# Patient Record
(demographics unavailable — no encounter records)

---

## 2024-10-22 NOTE — HISTORY OF PRESENT ILLNESS
[FreeTextEntry1] : LOTUS CORTES is a 72-year-old M, former smoker, w/ PMHx of HTN, HLD, DM, cardiac stent x 2 (on ASA & plavix), who was referred by Dr. Carlos Goldman for hemoptysis and right upper lobe mass. On 06/11/2024, he had navigational bronchoscopy and EBUS biopsy, which revealed adenocarcinoma of the RUL, negative LNs in level 7, 11R, 11L. On 07/19/2024, he underwent a right VATS, robotic assisted, right upper lobectomy, MLND. Surgical pathology revealed - pT3N0, Invasive papillary adenocarcinoma.   Patient was referred to oncologist Dr. Zachary Lau for further evaluation, he presents today for a post-op follow up with CXR.

## 2024-10-22 NOTE — CONSULT LETTER
[Dear  ___] : Dear  [unfilled], [Consult Letter:] : I had the pleasure of evaluating your patient, [unfilled]. [Please see my note below.] : Please see my note below. [Consult Closing:] : Thank you very much for allowing me to participate in the care of this patient.  If you have any questions, please do not hesitate to contact me. [Sincerely,] : Sincerely, [FreeTextEntry3] : Gaurav Rollins MD Professor, Cardiovascular & Thoracic Surgery [facility] [employer] Director of the [facility]  Director of Thoracic Surgery, [address] [place] [address] Phone: [phone] Fax: [phone]

## 2024-11-04 NOTE — ASSESSMENT
[FreeTextEntry1] : LOTUS CORTES is a 72-year-old M, former smoker, w/ PMHx of HTN, HLD, DM, cardiac stent x 2 (on ASA & plavix), who was referred by Dr. Carlos Goldman for hemoptysis and right upper lobe mass. On 06/11/2024, he had navigational bronchoscopy and EBUS biopsy, which revealed adenocarcinoma of the RUL, negative LNs in level 7, 11R, 11L. On 07/19/2024, he underwent a right VATS, robotic assisted, right upper lobectomy, MLND. Surgical pathology revealed - pT3N0, Invasive papillary adenocarcinoma, total tumor size 6.7cm. Spread Through Air Spaces: Present. Visceral Pleura Invasion: Not identified. Lymphovascular Invasion: Not identified. All margins negative. Patient was referred to oncologist Dr. Zachary Lau for further evaluation of stage IIB adenocarcinoma.   He presents today for a follow up visit to discuss his symptoms. Overall, he is recovering well, admits to productive cough with white mucus as well as SOB on exertion. Pian is well controlled, his incisions are healed. He had completed 3 cycles of Chemo on 08/28, 09/18, 10/09 but did not show up for last dose of treatment which is scheduled on 10/30 due to swelling of his feet and feeling of no energy. He will follow up with oncologist Dr. Lau. Zachary. We will plan for a CT scan of chest in 3 months for lung cancer surveillance. We will also give him some antitussive for better manager his symptoms. Patient agreed with the plan.   1. RX Antitussive  2. CT scan of chest no contrast in 3 months   Ashley NARVAEZ, RN, am scribing for and the presence of Dr. DAX MAYEN the following sections: history of present illness, past medical/family/surgical/family/social history, review of systems, vital signs, physical exam, and disposition.  DAX NARVAEZ, personally performed the services described in the documentation, reviewed the documentation recorded by the scribe in my presence and it accurately and completely records my words and actions.

## 2024-11-04 NOTE — HISTORY OF PRESENT ILLNESS
[FreeTextEntry1] : LOTUS CORTES is a 72-year-old M, former smoker, w/ PMHx of HTN, HLD, DM, cardiac stent x 2 (on ASA & plavix), who was referred by Dr. Carlos Goldman for hemoptysis and right upper lobe mass. On 06/11/2024, he had navigational bronchoscopy and EBUS biopsy, which revealed adenocarcinoma of the RUL, negative LNs in level 7, 11R, 11L. On 07/19/2024, he underwent a right VATS, robotic assisted, right upper lobectomy, MLND. Surgical pathology revealed - pT3N0, Invasive papillary adenocarcinoma, total tumor size 6.7cm. Spread Through Air Spaces: Present. Visceral Pleura Invasion: Not identified. Lymphovascular Invasion: Not identified. All margins negative.  Patient was referred to oncologist Dr. Zachary Lau for further evaluation of stage IIB adenocarcinoma, planning for 4 cycles of adjuvant carboplatin +paclitaxel, and addition of pembrolizumab maintenance after adjuvant chemotherapy for up to 18 cycles if tolerated. The patient presents today for a post-op follow up with CXR.

## 2024-11-04 NOTE — PHYSICAL EXAM
[] : no respiratory distress [Auscultation Breath Sounds / Voice Sounds] : lungs were clear to auscultation bilaterally [Heart Rate And Rhythm] : heart rate was normal and rhythm regular [Heart Sounds] : normal S1 and S2 [Heart Sounds Gallop] : no gallops [Murmurs] : no murmurs [Heart Sounds Pericardial Friction Rub] : no pericardial rub [Examination Of The Chest] : the chest was normal in appearance [Chest Visual Inspection Thoracic Asymmetry] : no chest asymmetry [Diminished Respiratory Excursion] : normal chest expansion [Abnormal Walk] : normal gait [Nail Clubbing] : no clubbing  or cyanosis of the fingernails [Musculoskeletal - Swelling] : no joint swelling seen [Motor Tone] : muscle strength and tone were normal [Oriented To Time, Place, And Person] : oriented to person, place, and time [Impaired Insight] : insight and judgment were intact [Affect] : the affect was normal

## 2024-11-04 NOTE — CONSULT LETTER
[FreeTextEntry3] : Gaurav Rollins MD Professor, Cardiovascular & Thoracic Surgery [facility] [employer] Director of the [facility]  Director of Thoracic Surgery, [address] [place] [address] Phone: [phone] Fax: [phone]

## 2024-11-04 NOTE — CONSULT LETTER
[FreeTextEntry3] : Gaurav Rollins MD Professor, Cardiovascular & Thoracic Surgery [facility] [employer] Director of the [facility]  Director of Thoracic Surgery, [address] [place] [address] Phone: [phone] Fax: [phone]

## 2025-07-02 NOTE — HISTORY OF PRESENT ILLNESS
[FreeTextEntry1] : LOTUS CORTES is a 73-year-old M, former smoker, w/ PMHx of HTN, HLD, DM, cardiac stent x 2 (on ASA & Plavix), who was referred by Dr. Carlos Goldman for hemoptysis and right upper lobe mass. On 06/11/2024, he had navigational bronchoscopy and EBUS biopsy, which revealed adenocarcinoma of the RUL, negative LNs in level 7, 11R, 11L. On 07/19/2024, he underwent a right VATS, robotic assisted, right upper lobectomy, MLND. Surgical pathology revealed - pT3N0, Invasive papillary adenocarcinoma, total tumor size 6.7cm. Spread Through Air Spaces: Present. Visceral Pleura Invasion: Not identified. Lymphovascular Invasion: Not identified. All margins negative.  Patient was referred to oncologist Dr. Zachary Lau for further evaluation of stage IIB adenocarcinoma, he completed chemotherapy in 2024 and still on immunotherapy (pembrolizumab)?? He presents today for a follow up visit to review the surveillance CT scan results, report is as follows:  CT chest on 06/29/2025:  -New right lower lobe subpleural 1.9 cm nodular opacity with associated reticular scarring and sub-centimeter ground-glass centrilobular nodules and left lower lobe patchy nodular opacity with surrounding ground glass attenuation. This may be infectious/inflammatory in etiology, however, short-term follow up in 6-8 weeks with low dose non-contrast CT is recommended.  -Mildly aneurysmal 4.2 cm ascending thoracic aorta.

## 2025-07-18 NOTE — CONSULT LETTER
[FreeTextEntry3] : Gaurav Rollins MD Professor, Cardiovascular & Thoracic Surgery [facility] [employer] Director of the [facility]  Director of Thoracic Surgery, [address] [place] [address] Phone: [phone] Fax: [phone]

## 2025-07-18 NOTE — ASSESSMENT
[FreeTextEntry1] : LOTUS CORTES is a 73-year-old M, former smoker, w/ PMHx of HTN, HLD, DM, cardiac stent x 2 (on ASA & Plavix), who was referred by Dr. Carlos Goldman for hemoptysis and right upper lobe mass. On 06/11/2024, he had navigational bronchoscopy and EBUS biopsy, which revealed adenocarcinoma of the RUL, negative LNs in level 7, 11R, 11L. On 07/19/2024, he underwent a right VATS, robotic assisted, right upper lobectomy, MLND. Surgical pathology revealed - pT3N0, Invasive papillary adenocarcinoma, total tumor size 6.7cm. Spread Through Air Spaces: Present. Visceral Pleura Invasion: Not identified. Lymphovascular Invasion: Not identified. All margins negative.  Patient was referred to oncologist Dr. Zachary Lau for further evaluation of stage IIB adenocarcinoma. He f/u with Dr. Seferino Narayanan, he had completed 3 cycles of Chemo on 08/28, 09/18, 10/09 but did not show up for last dose of treatment which is scheduled on 10/30 due to swelling of his feet and feeling of no energy. No f/u with Dr. Seferino Narayanan since then.   CT Chest on 6/29/25: - post-op changes - new RLL subpleural 1.9 cm nodular opacity (3:15) with adjacent scarring and numerous ggos - also new patchy LLL subpleural nodules opacities with surrounding ggo  - stable 3 mm LLL (3:171)  I have reviewed the patient's medical records and diagnostic images at time of this office consultation and have made the following recommendation: 1. CT chest reviewed and explained to patient and his family member, new findings on current scan likely inflammatory/infectious, will give a course of abx and RTC in 3 months with CT chest w/o contrast to assess its stability.  2. F/u with Dr. Narayanan.   I, DAX Greene, personally performed the evaluation and management (E/M) services for this established patient who follow up today with an existing condition.  That E/M includes conducting the examination, assessing all new/exacerbated/existing conditions, and establishing a plan of care.  Today, my ACP, Lavinia Johnson ANP-C, was here to observe my evaluation and management services for this existing condition to be followed going forward.

## 2025-07-18 NOTE — CONSULT LETTER
[FreeTextEntry3] : Gaurav Rollins MD Professor, Cardiovascular & Thoracic Surgery [facility] [employer] Director of the [facility]  Director of Thoracic Surgery, [address] [place] [address] Phone: [phone] Fax: [phone]

## 2025-07-18 NOTE — HISTORY OF PRESENT ILLNESS
[FreeTextEntry1] : LOTUS CORTES is a 73-year-old M, former smoker, w/ PMHx of HTN, HLD, DM, cardiac stent x 2 (on ASA & Plavix), who was referred by Dr. Carlos Goldman for hemoptysis and right upper lobe mass. On 06/11/2024, he had navigational bronchoscopy and EBUS biopsy, which revealed adenocarcinoma of the RUL, negative LNs in level 7, 11R, 11L. On 07/19/2024, he underwent a right VATS, robotic assisted, right upper lobectomy, MLND. Surgical pathology revealed - pT3N0, Invasive papillary adenocarcinoma, total tumor size 6.7cm. Spread Through Air Spaces: Present. Visceral Pleura Invasion: Not identified. Lymphovascular Invasion: Not identified. All margins negative.  Patient was referred to oncologist Dr. Zachary Lau for further evaluation of stage IIB adenocarcinoma. He f/u with Dr. Seferino Narayanan, he had completed 3 cycles of Chemo on 08/28, 09/18, 10/09 but did not show up for last dose of treatment which is scheduled on 10/30 due to swelling of his feet and feeling of no energy. No f/u with Dr. Seferino Narayanan since then.   CT Chest on 6/29/25: - post-op changes - new RLL subpleural 1.9 cm nodular opacity (3:15) with adjacent scarring and numerous ggos - also new patchy LLL subpleural nodules opacities with surrounding ggo  - stable 3 mm LLL (3:171)  Pt presents today for follow up.

## 2025-07-18 NOTE — CONSULT LETTER
[FreeTextEntry3] : Gaurav Rollins MD Professor, Cardiovascular & Thoracic Surgery [facility] [employer] Director of the [facility]  Director of Thoracic Surgery, [address] [place] [address] Phone: [phone] Fax: [phone]